# Patient Record
Sex: FEMALE | Race: OTHER | HISPANIC OR LATINO | URBAN - METROPOLITAN AREA
[De-identification: names, ages, dates, MRNs, and addresses within clinical notes are randomized per-mention and may not be internally consistent; named-entity substitution may affect disease eponyms.]

---

## 2022-07-07 ENCOUNTER — EMERGENCY (EMERGENCY)
Facility: HOSPITAL | Age: 82
LOS: 1 days | Discharge: HOME | End: 2022-07-07
Attending: EMERGENCY MEDICINE | Admitting: EMERGENCY MEDICINE
Payer: COMMERCIAL

## 2022-07-07 VITALS
DIASTOLIC BLOOD PRESSURE: 80 MMHG | RESPIRATION RATE: 16 BRPM | WEIGHT: 186.29 LBS | TEMPERATURE: 98 F | HEIGHT: 59 IN | SYSTOLIC BLOOD PRESSURE: 199 MMHG | OXYGEN SATURATION: 96 % | HEART RATE: 81 BPM

## 2022-07-07 DIAGNOSIS — R07.89 OTHER CHEST PAIN: ICD-10-CM

## 2022-07-07 DIAGNOSIS — I10 ESSENTIAL (PRIMARY) HYPERTENSION: ICD-10-CM

## 2022-07-07 DIAGNOSIS — E78.00 PURE HYPERCHOLESTEROLEMIA, UNSPECIFIED: ICD-10-CM

## 2022-07-07 DIAGNOSIS — Z20.822 CONTACT WITH AND (SUSPECTED) EXPOSURE TO COVID-19: ICD-10-CM

## 2022-07-07 DIAGNOSIS — R51.9 HEADACHE, UNSPECIFIED: ICD-10-CM

## 2022-07-07 PROCEDURE — 99053 MED SERV 10PM-8AM 24 HR FAC: CPT

## 2022-07-07 PROCEDURE — 93010 ELECTROCARDIOGRAM REPORT: CPT

## 2022-07-07 PROCEDURE — 71045 X-RAY EXAM CHEST 1 VIEW: CPT | Mod: 26

## 2022-07-07 PROCEDURE — 99285 EMERGENCY DEPT VISIT HI MDM: CPT | Mod: 25

## 2022-07-08 VITALS
DIASTOLIC BLOOD PRESSURE: 68 MMHG | OXYGEN SATURATION: 99 % | SYSTOLIC BLOOD PRESSURE: 153 MMHG | HEART RATE: 70 BPM | RESPIRATION RATE: 16 BRPM

## 2022-07-08 LAB
ALBUMIN SERPL ELPH-MCNC: 4.5 G/DL — SIGNIFICANT CHANGE UP (ref 3.3–5)
ALP SERPL-CCNC: 175 U/L — HIGH (ref 40–120)
ALT FLD-CCNC: 15 U/L — SIGNIFICANT CHANGE UP (ref 10–45)
ANION GAP SERPL CALC-SCNC: 9 MMOL/L — SIGNIFICANT CHANGE UP (ref 5–17)
APTT BLD: 25.4 SEC — LOW (ref 27.5–35.5)
AST SERPL-CCNC: 18 U/L — SIGNIFICANT CHANGE UP (ref 10–40)
BASOPHILS # BLD AUTO: 0.09 K/UL — SIGNIFICANT CHANGE UP (ref 0–0.2)
BASOPHILS NFR BLD AUTO: 1.1 % — SIGNIFICANT CHANGE UP (ref 0–2)
BILIRUB SERPL-MCNC: 0.2 MG/DL — SIGNIFICANT CHANGE UP (ref 0.2–1.2)
BUN SERPL-MCNC: 16 MG/DL — SIGNIFICANT CHANGE UP (ref 7–23)
CALCIUM SERPL-MCNC: 9.3 MG/DL — SIGNIFICANT CHANGE UP (ref 8.4–10.5)
CHLORIDE SERPL-SCNC: 104 MMOL/L — SIGNIFICANT CHANGE UP (ref 96–108)
CO2 SERPL-SCNC: 32 MMOL/L — HIGH (ref 22–31)
CREAT SERPL-MCNC: 0.78 MG/DL — SIGNIFICANT CHANGE UP (ref 0.5–1.3)
EGFR: 76 ML/MIN/1.73M2 — SIGNIFICANT CHANGE UP
EOSINOPHIL # BLD AUTO: 0.9 K/UL — HIGH (ref 0–0.5)
EOSINOPHIL NFR BLD AUTO: 11.5 % — HIGH (ref 0–6)
GLUCOSE SERPL-MCNC: 126 MG/DL — HIGH (ref 70–99)
HCT VFR BLD CALC: 41 % — SIGNIFICANT CHANGE UP (ref 34.5–45)
HGB BLD-MCNC: 14 G/DL — SIGNIFICANT CHANGE UP (ref 11.5–15.5)
IMM GRANULOCYTES NFR BLD AUTO: 0.3 % — SIGNIFICANT CHANGE UP (ref 0–1.5)
INR BLD: 0.89 — SIGNIFICANT CHANGE UP (ref 0.88–1.16)
LIDOCAIN IGE QN: 38 U/L — SIGNIFICANT CHANGE UP (ref 7–60)
LYMPHOCYTES # BLD AUTO: 2.42 K/UL — SIGNIFICANT CHANGE UP (ref 1–3.3)
LYMPHOCYTES # BLD AUTO: 30.9 % — SIGNIFICANT CHANGE UP (ref 13–44)
MCHC RBC-ENTMCNC: 30.9 PG — SIGNIFICANT CHANGE UP (ref 27–34)
MCHC RBC-ENTMCNC: 34.1 GM/DL — SIGNIFICANT CHANGE UP (ref 32–36)
MCV RBC AUTO: 90.5 FL — SIGNIFICANT CHANGE UP (ref 80–100)
MONOCYTES # BLD AUTO: 0.78 K/UL — SIGNIFICANT CHANGE UP (ref 0–0.9)
MONOCYTES NFR BLD AUTO: 9.9 % — SIGNIFICANT CHANGE UP (ref 2–14)
NEUTROPHILS # BLD AUTO: 3.63 K/UL — SIGNIFICANT CHANGE UP (ref 1.8–7.4)
NEUTROPHILS NFR BLD AUTO: 46.3 % — SIGNIFICANT CHANGE UP (ref 43–77)
NRBC # BLD: 0 /100 WBCS — SIGNIFICANT CHANGE UP (ref 0–0)
NT-PROBNP SERPL-SCNC: 140 PG/ML — SIGNIFICANT CHANGE UP (ref 0–300)
PLATELET # BLD AUTO: 225 K/UL — SIGNIFICANT CHANGE UP (ref 150–400)
POTASSIUM SERPL-MCNC: 4.3 MMOL/L — SIGNIFICANT CHANGE UP (ref 3.5–5.3)
POTASSIUM SERPL-SCNC: 4.3 MMOL/L — SIGNIFICANT CHANGE UP (ref 3.5–5.3)
PROT SERPL-MCNC: 7.2 G/DL — SIGNIFICANT CHANGE UP (ref 6–8.3)
PROTHROM AB SERPL-ACNC: 10.6 SEC — SIGNIFICANT CHANGE UP (ref 10.5–13.4)
RBC # BLD: 4.53 M/UL — SIGNIFICANT CHANGE UP (ref 3.8–5.2)
RBC # FLD: 11.9 % — SIGNIFICANT CHANGE UP (ref 10.3–14.5)
SARS-COV-2 RNA SPEC QL NAA+PROBE: NEGATIVE — SIGNIFICANT CHANGE UP
SODIUM SERPL-SCNC: 145 MMOL/L — SIGNIFICANT CHANGE UP (ref 135–145)
TROPONIN T SERPL-MCNC: 0.01 NG/ML — SIGNIFICANT CHANGE UP (ref 0–0.01)
TROPONIN T SERPL-MCNC: <0.01 NG/ML — SIGNIFICANT CHANGE UP (ref 0–0.01)
WBC # BLD: 7.84 K/UL — SIGNIFICANT CHANGE UP (ref 3.8–10.5)
WBC # FLD AUTO: 7.84 K/UL — SIGNIFICANT CHANGE UP (ref 3.8–10.5)

## 2022-07-08 PROCEDURE — 36415 COLL VENOUS BLD VENIPUNCTURE: CPT

## 2022-07-08 PROCEDURE — 70450 CT HEAD/BRAIN W/O DYE: CPT | Mod: MA

## 2022-07-08 PROCEDURE — 83690 ASSAY OF LIPASE: CPT

## 2022-07-08 PROCEDURE — 85610 PROTHROMBIN TIME: CPT

## 2022-07-08 PROCEDURE — 93005 ELECTROCARDIOGRAM TRACING: CPT

## 2022-07-08 PROCEDURE — 99285 EMERGENCY DEPT VISIT HI MDM: CPT | Mod: 25

## 2022-07-08 PROCEDURE — 71045 X-RAY EXAM CHEST 1 VIEW: CPT | Mod: 26

## 2022-07-08 PROCEDURE — 85730 THROMBOPLASTIN TIME PARTIAL: CPT

## 2022-07-08 PROCEDURE — 80053 COMPREHEN METABOLIC PANEL: CPT

## 2022-07-08 PROCEDURE — 83880 ASSAY OF NATRIURETIC PEPTIDE: CPT

## 2022-07-08 PROCEDURE — 70450 CT HEAD/BRAIN W/O DYE: CPT | Mod: 26,MA

## 2022-07-08 PROCEDURE — 84484 ASSAY OF TROPONIN QUANT: CPT

## 2022-07-08 PROCEDURE — 85025 COMPLETE CBC W/AUTO DIFF WBC: CPT

## 2022-07-08 PROCEDURE — 71045 X-RAY EXAM CHEST 1 VIEW: CPT

## 2022-07-08 PROCEDURE — 87635 SARS-COV-2 COVID-19 AMP PRB: CPT

## 2022-07-08 RX ORDER — AMLODIPINE BESYLATE 2.5 MG/1
1 TABLET ORAL
Qty: 14 | Refills: 0
Start: 2022-07-08 | End: 2022-07-21

## 2022-07-08 RX ORDER — AMLODIPINE BESYLATE 2.5 MG/1
5 TABLET ORAL ONCE
Refills: 0 | Status: COMPLETED | OUTPATIENT
Start: 2022-07-08 | End: 2022-07-08

## 2022-07-08 RX ORDER — ACETAMINOPHEN 500 MG
650 TABLET ORAL ONCE
Refills: 0 | Status: COMPLETED | OUTPATIENT
Start: 2022-07-08 | End: 2022-07-08

## 2022-07-08 RX ORDER — HYDRALAZINE HCL 50 MG
10 TABLET ORAL ONCE
Refills: 0 | Status: COMPLETED | OUTPATIENT
Start: 2022-07-08 | End: 2022-07-08

## 2022-07-08 RX ORDER — LOSARTAN POTASSIUM 100 MG/1
50 TABLET, FILM COATED ORAL ONCE
Refills: 0 | Status: COMPLETED | OUTPATIENT
Start: 2022-07-08 | End: 2022-07-08

## 2022-07-08 RX ADMIN — Medication 650 MILLIGRAM(S): at 00:34

## 2022-07-08 RX ADMIN — Medication 650 MILLIGRAM(S): at 01:50

## 2022-07-08 RX ADMIN — Medication 10 MILLIGRAM(S): at 03:22

## 2022-07-08 RX ADMIN — LOSARTAN POTASSIUM 50 MILLIGRAM(S): 100 TABLET, FILM COATED ORAL at 00:34

## 2022-07-08 RX ADMIN — AMLODIPINE BESYLATE 5 MILLIGRAM(S): 2.5 TABLET ORAL at 01:54

## 2022-07-08 NOTE — ED ADULT NURSE NOTE - NSIMPLEMENTINTERV_GEN_ALL_ED
Implemented All Universal Safety Interventions:  [place] to call system. Call bell, personal items and telephone within reach. Instruct patient to call for assistance. Room bathroom lighting operational. Non-slip footwear when patient is off stretcher. Physically safe environment: no spills, clutter or unnecessary equipment. Stretcher in lowest position, wheels locked, appropriate side rails in place.

## 2022-07-08 NOTE — ED ADULT NURSE NOTE - NEURO MENTATION
pt c/o lower abd pain since Sunday. denies N/V/D.   pt stated he felt bloated.  last regular BM was Saturday. normal

## 2022-07-08 NOTE — ED PROVIDER NOTE - CLINICAL SUMMARY MEDICAL DECISION MAKING FREE TEXT BOX
elevated BP for past few months, now with HA, pressure behind eyes, intermittent left chest pain, no focal neuro deficits  -check labs  -ekg  -losartan, tylenol  -CT head  -cxr

## 2022-07-08 NOTE — ED PROVIDER NOTE - NSFOLLOWUPCLINICS_GEN_ALL_ED_FT
[facility]  Family Medicine  [address], [address] [place] [address]  Phone: [phone]  Fax:     [address]  Family Medicine  [address] [place] [address]  Phone: [phone]  Fax: [phone]

## 2022-07-08 NOTE — ED PROVIDER NOTE - PROGRESS NOTE DETAILS
BP improved, will add norvasc to BP regimen. recommend f/u in med clinic.   I have discussed the discharge plan with the patient. The patient agrees with the plan, as discussed.  The patient understands Emergency Department diagnosis is a preliminary diagnosis often based on limited information and that the patient must adhere to the follow-up plan as discussed.  The patient understands that if the symptoms worsen or if prescribed medications do not have the desired/planned effect that the patient may return to the Emergency Department at any time for further evaluation and treatment.

## 2022-07-08 NOTE — ED PROVIDER NOTE - NSFOLLOWUPINSTRUCTIONS_ED_ALL_ED_FT
Hipertensión en los adultos    Hypertension, Adult    La presión arterial jona (hipertensión) se produce cuando la fuerza de la diane bombea a través de las arterias con mucha fuerza. Las arterias son los vasos sanguíneos que transportan la diane desde el corazón al mynor del cuerpo. La hipertensión hace que el corazón manuel más esfuerzo para bombear diane y puede provocar que las arterias se estrechen o endurezcan. La hipertensión no tratada o no controlada puede causar infarto de miocardio, insuficiencia cardíaca, accidente cerebrovascular, enfermedad renal y otros problemas.    Maribel lectura de la presión arterial consta de un número más alto sobre un número más bajo. En condiciones ideales, la presión arterial debe estar por debajo de 120/80. El primer número (“superior”) es la presión sistólica. Es la medida de la presión de las arterias cuando el corazón late. El chance número (“inferior”) es la presión diastólica. Es la medida de la presión en las arterias cuando el corazón se relaja.    ¿Cuáles son las causas?    Se desconoce la causa exacta de esta afección. Hay algunas afecciones que causan presión arterial jona o están relacionadas con gilberto.    ¿Qué incrementa el riesgo?    Algunos factores de riesgo de hipertensión están bajo castellanos control. Los siguientes factores pueden hacer que sea más propenso a desarrollar esta afección:  •Fumar.    •Tener diabetes mellitus tipo 2, colesterol alto, o ambos.    •No hacer la cantidad suficiente de actividad física o ejercicio.    •Tener sobrepeso.    •Consumir mucha grasa, azúcar, calorías o sal (sodio) en castellanos dieta.    •Beber alcohol en exceso.    Algunos factores de riesgo para la presión arterial jona pueden ser difíciles o imposibles de cambiar. Algunos de estos factores son los siguientes:  •Tener enfermedad renal crónica.    •Tener antecedentes familiares de presión arterial jona.    •Edad. Los riesgos aumentan con la edad.    •Duong. El riesgo es mayor para las personas afroamericanas.    •Sexo. Antes de los 45 años, los hombres corren más riesgo que las mujeres. Después de los 65 años, las mujeres corren más riesgo que los hombres.    •Tener apnea obstructiva del sueño.    •Estrés.    ¿Cuáles son los signos o los síntomas?    Es posible que la presión arterial jona puede no cause síntomas. La presión arterial muy jona (crisis hipertensiva) puede provocar:  •Dolor de mona.    •Ansiedad.    •Falta de aire.    •Hemorragia nasal.    •Náuseas y vómitos.    •Cambios en la visión.    •Dolor de pecho intenso.    •Convulsiones.    ¿Cómo se diagnostica?    Esta afección se diagnostica al medir castellanos presión arterial mientras se encuentra sentado, con el brazo apoyado sobre maribel superficie plana, las piernas sin cruzar y los pies noble apoyados en el piso. El brazalete del tensiómetro debe colocarse directamente sobre la piel de la parte superior del brazo y al nivel de castellanos corazón. Debe medirla al menos dos veces en el mismo brazo. Determinadas condiciones pueden causar maribel diferencia de presión arterial entre el brazo nan y el derecho.    Ciertos factores pueden provocar que las lecturas de la presión arterial forest inferiores o superiores a lo normal por un período corto de tiempo:  •Si castellanos presión arterial es más jona cuando se encuentra en el consultorio del médico que cuando la mide en castellanos hogar, se denomina “hipertensión de bata chari”. La mayoría de las personas que tienen esta afección no deben ser medicadas.    •Si castellanos presión arterial es más jona en el hogar que cuando se encuentra en el consultorio del médico, se denomina “hipertensión enmascarada”. La mayoría de las personas que tienen esta afección deben ser medicadas para controlar la presión arterial.    Si tiene maribel lecturas de presión arterial jona barb maribel visita o si tiene presión arterial normal con otros factores de riesgo, se le podrá pedir que manuel lo siguiente:  •Que regrese otro día para volver a controlar castellanos presión arterial nuevamente.    •Que se controle la presión arterial en castellanos casa barb 1 semana o más.    Si se le diagnostica hipertensión, es posible que se le realicen otros análisis de diane o estudios de diagnóstico por imágenes para ayudar a castellanos médico a comprender castellanos riesgo general de tener otras afecciones.    ¿Cómo se trata?    Esta afección se trata haciendo cambios saludables en el estilo de mabel, tales luis ingerir alimentos saludables, realizar más ejercicio y reducir el consumo de alcohol. El médico puede recetarle medicamentos si los cambios en el estilo de mabel no son suficientes para lograr controlar la presión arterial y si:  •Castellanos presión arterial sistólica está por encima de 130.    •Castellanos presión arterial diastólica está por encima de 80.    La presión arterial deseada puede variar en función de las enfermedades, la edad y otros factores personales.    Siga estas instrucciones en castellanos casa:    Comida y bebida    •Siga maribel dieta con alto contenido de fibras y potasio, y con bajo contenido de sodio, azúcar agregada y grasas. Un ejemplo de plan alimenticio es la dieta DASH (Dietary Approaches to Stop Hypertension, Métodos alimenticios para detener la hipertensión). Para alimentarse de esta manera:  •Coma mucha fruta y verdura fresca. Trate de que la mitad del plato de cada comida sea de frutas y verduras.    •Coma cereales integrales, luis pasta integral, arroz integral o pan integral. Llene aproximadamente un cuarto del plato con cereales integrales.    •Coma y kell productos lácteos con bajo contenido de grasa, luis leche descremada o yogur bajo en grasas.    •Evite la ingesta de ratliff de carne grasa, carne procesada o curada, y carne de ave con piel. Llene aproximadamente un cuarto del plato con proteínas magras, luis pescado, ella sin piel, frijoles, huevos o tofu.    •Evite ingerir alimentos prehechos y procesados. En general, estos tienen mayor cantidad de sodio, azúcar agregada y grasa.    •Reduzca castellanos ingesta diaria de sodio. La mayoría de las personas que tienen hipertensión deben comer menos de 1500 mg de sodio por día.    • No kell alcohol si:  •Castellanos médico le indica no hacerlo.    •Está embarazada, puede estar embarazada o está tratando de quedar embarazada.    •Si jcarlos alcohol:•Limite la cantidad que jcarlos a lo siguiente:  •De 0 a 1 medida por día para las mujeres.    •De 1 a 2 medidas por día para los hombres.    •Esté atento a la cantidad de alcohol que hay en las bebidas que gino. En los [place], maribel medida equivale a maribel botella de cerveza de 12 oz (355 ml), un vaso de vino de 5 oz (148 ml) o un vaso de maribel bebida alcohólica de jona graduación de 1½ oz (44 ml).    Estilo de mabel      •Trabaje con castellanos médico para mantener un peso saludable o perder peso. Pregúntele cuál es el peso recomendado para usted.    •Manuel al menos 30 minutos de ejercicio la mayoría de los días de la semana. Estas actividades pueden incluir caminar, nadar o andar en bicicleta.    •Incluya ejercicios para fortalecer maggie músculos (ejercicios de resistencia), luis [place] o levantamiento de pesas, luis parte de castellanos rutina semanal de ejercicios. Intente realizar 30 minutos de laurie tipo de ejercicios al menos phillip días a la semana.    • No consuma ningún producto que contenga nicotina o tabaco, luis cigarrillos, cigarrillos electrónicos y tabaco de mascar. Si necesita ayuda para dejar de fumar, consulte al médico.    •Contrólese la presión arterial en castellanos casa según las indicaciones del médico.    •Concurra a todas las visitas de seguimiento luis se lo haya indicado el médico. [place] es importante.    Medicamentos     •[place] los medicamentos de venta rex y los recetados solamente luis se lo haya indicado el médico. Siga cuidadosamente las indicaciones. Los medicamentos para la presión arterial deben tomarse según las indicaciones.    • No omita las dosis de medicamentos para la presión arterial. Si lo hace, estará en riesgo de tener problemas y puede hacer que los medicamentos forest menos eficaces.    •Pregúntele a castellanos médico a qué efectos secundarios o reacciones a los medicamentos debe prestar atención.    Comuníquese con un médico si:    •Piensa que tiene maribel reacción a un medicamento que está tomando.    •Tiene vivi de mona frecuentes (recurrentes).    •Se siente mareado.    •Tiene hinchazón en los tobillos.    •Tiene problemas de visión.    Solicite ayuda inmediatamente si:    •Siente un dolor de mona intenso o confusión.    •Siente debilidad inusual o adormecimiento.    •Siente que va a desmayarse.    •Siente un dolor intenso en el pecho o el abdomen.    •Vomita repetidas veces.    •Tiene dificultad para respirar.    Resumen    •La hipertensión se produce cuando la diane bombea en las arterias con mucha fuerza. Si esta afección no se controla, podría correr riesgo de tener complicaciones graves.    •La presión arterial deseada puede variar en función de las enfermedades, la edad y otros factores personales. Para la mayoría de las personas, maribel presión arterial normal es demario que 120/80.    •La hipertensión se trata con cambios en el estilo de mabel, medicamentos o maribel combinación de ambos. Los cambios en el estilo de mabel incluyen pérdida de peso, ingerir alimentos sanos, seguir maribel dieta baja en sodio, hacer más ejercicio y limitar el consumo de alcohol.    Esta información no tiene luis fin reemplazar el consejo del médico. Asegúrese de hacerle al médico cualquier pregunta que tenga.      Dolor de pecho no específico en los adultos    Nonspecific Chest Pain, Adult    El dolor de pecho es maribel sensación molesta, opresiva o dolorosa en el pecho. El dolor se siente luis maribel aplastamiento, torsión u opresión en el pecho. Maribel persona puede sentir maribel sensación de ardor u hormigueo. El dolor de pecho también se puede sentir en la espalda, el verito, la mandíbula, el hombro o el brazo. Laurie dolor puede empeorar al moverse, estornudar o respirar profundamente.    El dolor de pecho puede deberse a maribel afección potencialmente mortal. Se debe tratar de inmediato. También puede ser provocado por algo que no es potencialmente mortal. Si tiene dolor de pecho, puede ser difícil saber la diferencia, por lo tanto es importante que obtenga ayuda de inmediato para asegurarse de que no tiene maribel afección grave.    Algunas causas potencialmente mortales del dolor de pecho incluyen:  •Infarto de miocardio.    •Un desgarro en el vaso sanguíneo principal del cuerpo (disección aórtica).    •Inflamación alrededor del corazón (pericarditis).    •Un problema en los pulmones, luis un coágulo de diane (embolia pulmonar) o un pulmón colapsado (neumotórax).    Algunas causas de dolor de pecho que no son potencialmente mortales incluyen:  •Acidez estomacal.    •Ansiedad o estrés.    •Daño de los huesos, los músculos y los cartílagos que conforman la pared torácica.    •Neumonía o bronquitis.    •Culebrilla (virus de la varicela zóster).    El dolor de pecho puede aparecer y desaparecer. También puede ser anusha. Castellanos médico le hará pruebas clínicas y otros estudios para tratar de determinar la causa del dolor. El tratamiento dependerá de la causa del dolor de pecho.    Siga estas instrucciones en castellanos casa:    Medicamentos     •Use los medicamentos de venta rex y los recetados solamente luis se lo haya indicado el médico.    •Si le recetaron un antibiótico, tómelo luis se lo haya indicado el médico. No deje de barron el antibiótico aunque comience a sentirse mejor.    Actividad     •Evite las actividades que le causen dolor de pecho.    • No levante ningún objeto que pese más de 10 libras (4,5 kg) o que supere el límite de peso que le hayan indicado, hasta que el médico le diga que puede hacerlo.    •Manuel reposo luis se lo haya indicado el médico.     •Retome maggie actividades normales solo luis se lo haya indicado el médico. Pregúntele al médico qué actividades son seguras para usted.    Estilo de mabel     A plate along with examples of foods in a healthy diet.     Silhouette of a person sitting on the floor doing yoga.     • No consuma ningún producto que contenga nicotina o tabaco, luis cigarrillos, cigarrillos electrónicos y tabaco de mascar. Si necesita ayuda para dejar de fumar, consulte al médico.    • No kell alcohol.    •Opte por un estilo de mabel saludable luis se lo hayan recomendado. Puede incluir:  •Practicar actividad física con regularidad. Pídale al médico que le sugiera ejercicios que forest seguros para usted.    •Seguir maribel dieta cardiosaludable. Esta debe incluir muchas frutas y verduras frescas, cereales integrales, proteínas (magras) con bajo contenido de grasa y productos lácteos bajos en grasa. Un nutricionista podrá ayudarlo a hacer elecciones de alimentación saludables.    •Mantener un peso saludable.    •Controlar cualquier otra afección que tenga, luis presión arterial jona (hipertensión) o diabetes.    •Disminuir el nivel de estrés; por ejemplo, con yoga o técnicas de relajación.    Instrucciones generales     •Esté atento a cualquier cambio en los síntomas.    •Es castellanos responsabilidad obtener los resultados de cualquier prueba que se haya realizado. Consulte al médico o pregunte en el departamento donde se realizan las pruebas cuándo estarán listos los resultados.    •Concurra a todas las visitas de seguimiento luis se lo haya indicado el médico. [place] es importante.     •Es posible que le pidan que se someta a más pruebas si el dolor de pecho no desaparece.    Comuníquese con un médico si:    •El dolor de pecho no desaparece.    •Se siente deprimido.    •Tiene fiebre.    •Nota cambios en los síntomas o desarrolla síntomas nuevos.    Solicite ayuda de inmediato si:    •El dolor en el pecho empeora.    •Tiene tos que empeora o tose con diane.    •Siente un dolor intenso en el abdomen.    •Se desmaya.    •Tiene maribel molestia repentina e inexplicable en el pecho.    •Tiene molestias repentinas e inexplicables en los brazos, la espalda, el veirto o la mandíbula.    •Le falta el aire en cualquier momento.    •Comienza a sudar de manera repentina o la piel se le humedece.    •Siente náuseas o vomita.    •Se siente repentinamente mareado o se desmaya.    •Tiene debilidad intensa, o debilidad o fatiga sin explicación.    •Siente que el corazón comienza a latir rápidamente o que se saltea latidos.    Estos síntomas pueden representar un problema grave que constituye maribel emergencia. No espere a jigna si los síntomas desaparecen. Solicite atención médica de inmediato. Comuníquese con el servicio de emergencias de castellanos localidad (911 en los [place]). No conduzca por maggie propios medios hasta el hospital.     Resumen    •El dolor de pecho puede ser provocado por maribel afección que es grave y requiere tratamiento urgente. También puede ser provocado por algo que no es potencialmente mortal.    •El médico puede hacerle pruebas clínicas y otros estudios para tratar de determinar la causa del dolor.    •Siga las instrucciones de castellanos médico sobre barron medicamentos, hacer cambios en castellanos estilo de mabel y recibir tratamiento de urgencia si los síntomas empeoran.    •Concurra a todas las visitas de seguimiento luis se lo haya indicado el médico. [place] incluye las visitas para realizarle otras pruebas si el dolor de pecho no desaparece.    Esta información no tiene luis fin reemplazar el consejo del médico. Asegúrese de hacerle al médico cualquier pregunta que tenga.

## 2022-07-08 NOTE — ED ADULT NURSE NOTE - OBJECTIVE STATEMENT
Pt presents to ED C/O HA, eye pressure and CP, with high blood pressure reading on BP machine at home. Pt Khmer speaking, translation services offered, Pt family at bedside. Pt neuro intact, speaking full sentences, denies numbness, tingling, weakness, ambulating independently. Placed on cardiac monitor. Pt has L hand injury PTA, wrapped in ace bandage, Family reports pt was seen by outpatient MD for hand injury.

## 2022-07-08 NOTE — ED PROVIDER NOTE - PATIENT PORTAL LINK FT
You can access the FollowMyHealth Patient Portal offered by [facility] by registering at the following website: [email or web address]. By joining [employer]’s FollowMyHealth portal, you will also be able to view your health information using other applications (apps) compatible with our system.

## 2022-07-08 NOTE — ED PROVIDER NOTE - OBJECTIVE STATEMENT
81F hx htn, high chol, c/o elevated BP. states she has been having elevated BP over past 3 months. states over past 2 days feeling pressure to front of head. states feels pressure behind her eyes. also with intermittent chest pain. no vomiting. no dizziness. no numbness/weakness. pt takes Eukene 40/12.5. 81F hx htn, high chol, c/o elevated BP. states she has been having elevated BP over past 3 months. states over past 2 days feeling pressure to front of head. states feels pressure behind her eyes. also with intermittent chest pain. no vomiting. no dizziness. no numbness/weakness. pt takes Eukene 40/12.5.    Daughter at bedside, to aid interpretation

## 2022-07-08 NOTE — ED PROVIDER NOTE - NSDCPRINTRESULTS_ED_ALL_ED
Full range of motion of upper and lower extremities, no joint tenderness/swelling.
Patient requests all Lab, Cardiology, and Radiology Results on their Discharge Instructions

## 2022-07-11 NOTE — ED POST DISCHARGE NOTE - DETAILS
spoke with daughter Franc. Patient does not have any sx of URI/LRI and feels well. She is visiting and will be returning home next month. Encouraged PCP follow up. Return to ER precautions d/w daughter in detail.

## 2023-05-18 NOTE — ED ADULT NURSE NOTE - CHIEF COMPLAINT QUOTE
Pt reports high BP x 2 days. Pt c/o intermittent blurry vision bilaterally and L sided CP. Enbrel Counseling:  I discussed with the patient the risks of etanercept including but not limited to myelosuppression, immunosuppression, autoimmune hepatitis, demyelinating diseases, lymphoma, and infections.  The patient understands that monitoring is required including a PPD at baseline and must alert us or the primary physician if symptoms of infection or other concerning signs are noted.